# Patient Record
Sex: MALE | Race: WHITE
[De-identification: names, ages, dates, MRNs, and addresses within clinical notes are randomized per-mention and may not be internally consistent; named-entity substitution may affect disease eponyms.]

---

## 2019-03-15 ENCOUNTER — HOSPITAL ENCOUNTER (INPATIENT)
Dept: HOSPITAL 74 - YASAS | Age: 39
LOS: 3 days | Discharge: LEFT BEFORE DISCHARGE | DRG: 770 | End: 2019-03-18
Attending: SURGERY | Admitting: SURGERY
Payer: COMMERCIAL

## 2019-03-15 ENCOUNTER — EMERGENCY (EMERGENCY)
Facility: HOSPITAL | Age: 39
LOS: 1 days | Discharge: HOME | End: 2019-03-15
Attending: EMERGENCY MEDICINE | Admitting: EMERGENCY MEDICINE
Payer: MEDICAID

## 2019-03-15 VITALS
SYSTOLIC BLOOD PRESSURE: 110 MMHG | TEMPERATURE: 98 F | RESPIRATION RATE: 16 BRPM | HEART RATE: 90 BPM | OXYGEN SATURATION: 97 % | DIASTOLIC BLOOD PRESSURE: 69 MMHG

## 2019-03-15 VITALS
OXYGEN SATURATION: 98 % | HEART RATE: 115 BPM | WEIGHT: 184.97 LBS | RESPIRATION RATE: 18 BRPM | SYSTOLIC BLOOD PRESSURE: 174 MMHG | DIASTOLIC BLOOD PRESSURE: 81 MMHG | TEMPERATURE: 99 F

## 2019-03-15 VITALS — BODY MASS INDEX: 22.3 KG/M2

## 2019-03-15 DIAGNOSIS — E87.6: ICD-10-CM

## 2019-03-15 DIAGNOSIS — S00.01XA ABRASION OF SCALP, INITIAL ENCOUNTER: ICD-10-CM

## 2019-03-15 DIAGNOSIS — R63.8: ICD-10-CM

## 2019-03-15 DIAGNOSIS — Z79.899 OTHER LONG TERM (CURRENT) DRUG THERAPY: ICD-10-CM

## 2019-03-15 DIAGNOSIS — F10.230: ICD-10-CM
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
## 2019-03-15 DIAGNOSIS — Z86.69: ICD-10-CM

## 2019-03-15 DIAGNOSIS — I10: ICD-10-CM
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
## 2019-03-15 DIAGNOSIS — Z23 ENCOUNTER FOR IMMUNIZATION: ICD-10-CM

## 2019-03-15 DIAGNOSIS — R00.0: ICD-10-CM

## 2019-03-15 DIAGNOSIS — F13.230: ICD-10-CM

## 2019-03-15 DIAGNOSIS — R56.9 UNSPECIFIED CONVULSIONS: ICD-10-CM

## 2019-03-15 DIAGNOSIS — F19.230 OTHER PSYCHOACTIVE SUBSTANCE DEPENDENCE WITH WITHDRAWAL, UNCOMPLICATED: ICD-10-CM

## 2019-03-15 DIAGNOSIS — Z79.891 LONG TERM (CURRENT) USE OF OPIATE ANALGESIC: ICD-10-CM

## 2019-03-15 DIAGNOSIS — F12.20: ICD-10-CM

## 2019-03-15 DIAGNOSIS — F17.210: ICD-10-CM
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
## 2019-03-15 DIAGNOSIS — R94.5: ICD-10-CM

## 2019-03-15 DIAGNOSIS — F19.24: ICD-10-CM
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
## 2019-03-15 DIAGNOSIS — M65.342: ICD-10-CM

## 2019-03-15 DIAGNOSIS — F14.20: ICD-10-CM

## 2019-03-15 DIAGNOSIS — F11.23: Primary | ICD-10-CM

## 2019-03-15 DIAGNOSIS — L02.811: ICD-10-CM

## 2019-03-15 LAB
ALBUMIN SERPL ELPH-MCNC: 3.8 G/DL — SIGNIFICANT CHANGE UP (ref 3.4–5)
ALP SERPL-CCNC: 92 U/L — SIGNIFICANT CHANGE UP (ref 40–120)
ALT FLD-CCNC: 213 U/L — HIGH (ref 12–42)
ANION GAP SERPL CALC-SCNC: 10 MMOL/L — SIGNIFICANT CHANGE UP (ref 9–16)
AST SERPL-CCNC: 135 U/L — HIGH (ref 15–37)
BILIRUB SERPL-MCNC: 0.4 MG/DL — SIGNIFICANT CHANGE UP (ref 0.2–1.2)
BUN SERPL-MCNC: 11 MG/DL — SIGNIFICANT CHANGE UP (ref 7–23)
CALCIUM SERPL-MCNC: 9.5 MG/DL — SIGNIFICANT CHANGE UP (ref 8.5–10.5)
CHLORIDE SERPL-SCNC: 105 MMOL/L — SIGNIFICANT CHANGE UP (ref 96–108)
CO2 SERPL-SCNC: 27 MMOL/L — SIGNIFICANT CHANGE UP (ref 22–31)
CREAT SERPL-MCNC: 0.97 MG/DL — SIGNIFICANT CHANGE UP (ref 0.5–1.3)
ETHANOL SERPL-MCNC: <3 MG/DL — SIGNIFICANT CHANGE UP
GLUCOSE SERPL-MCNC: 104 MG/DL — HIGH (ref 70–99)
HCT VFR BLD CALC: 42.5 % — SIGNIFICANT CHANGE UP (ref 39–50)
HGB BLD-MCNC: 14.7 G/DL — SIGNIFICANT CHANGE UP (ref 13–17)
MAGNESIUM SERPL-MCNC: 2.1 MG/DL — SIGNIFICANT CHANGE UP (ref 1.6–2.6)
MCHC RBC-ENTMCNC: 29.8 PG — SIGNIFICANT CHANGE UP (ref 27–34)
MCHC RBC-ENTMCNC: 34.6 G/DL — SIGNIFICANT CHANGE UP (ref 32–36)
MCV RBC AUTO: 86.2 FL — SIGNIFICANT CHANGE UP (ref 80–100)
PLATELET # BLD AUTO: 182 K/UL — SIGNIFICANT CHANGE UP (ref 150–400)
POTASSIUM SERPL-MCNC: 4.5 MMOL/L — SIGNIFICANT CHANGE UP (ref 3.5–5.3)
POTASSIUM SERPL-SCNC: 4.5 MMOL/L — SIGNIFICANT CHANGE UP (ref 3.5–5.3)
PROT SERPL-MCNC: 7.3 G/DL — SIGNIFICANT CHANGE UP (ref 6.4–8.2)
RBC # BLD: 4.93 M/UL — SIGNIFICANT CHANGE UP (ref 4.2–5.8)
RBC # FLD: 13.1 % — SIGNIFICANT CHANGE UP (ref 10.3–14.5)
SODIUM SERPL-SCNC: 142 MMOL/L — SIGNIFICANT CHANGE UP (ref 132–145)
TSH SERPL-MCNC: 2.28 UIU/ML — SIGNIFICANT CHANGE UP (ref 0.36–3.74)
WBC # BLD: 5.5 K/UL — SIGNIFICANT CHANGE UP (ref 3.8–10.5)
WBC # FLD AUTO: 5.5 K/UL — SIGNIFICANT CHANGE UP (ref 3.8–10.5)

## 2019-03-15 PROCEDURE — 73130 X-RAY EXAM OF HAND: CPT | Mod: 26,LT,RT

## 2019-03-15 PROCEDURE — 73120 X-RAY EXAM OF HAND: CPT | Mod: 26,LT,RT

## 2019-03-15 PROCEDURE — 99284 EMERGENCY DEPT VISIT MOD MDM: CPT | Mod: 25

## 2019-03-15 PROCEDURE — 70450 CT HEAD/BRAIN W/O DYE: CPT | Mod: 26

## 2019-03-15 PROCEDURE — 72125 CT NECK SPINE W/O DYE: CPT | Mod: 26

## 2019-03-15 PROCEDURE — 93010 ELECTROCARDIOGRAM REPORT: CPT

## 2019-03-15 RX ORDER — HYDROMORPHONE HYDROCHLORIDE 2 MG/ML
1 INJECTION INTRAMUSCULAR; INTRAVENOUS; SUBCUTANEOUS ONCE
Qty: 0 | Refills: 0 | Status: DISCONTINUED | OUTPATIENT
Start: 2019-03-15 | End: 2019-03-15

## 2019-03-15 RX ORDER — ONDANSETRON 8 MG/1
4 TABLET, FILM COATED ORAL ONCE
Qty: 0 | Refills: 0 | Status: COMPLETED | OUTPATIENT
Start: 2019-03-15 | End: 2019-03-15

## 2019-03-15 RX ORDER — SODIUM CHLORIDE 9 MG/ML
1000 INJECTION INTRAMUSCULAR; INTRAVENOUS; SUBCUTANEOUS ONCE
Qty: 0 | Refills: 0 | Status: COMPLETED | OUTPATIENT
Start: 2019-03-15 | End: 2019-03-15

## 2019-03-15 RX ORDER — TETANUS TOXOID, REDUCED DIPHTHERIA TOXOID AND ACELLULAR PERTUSSIS VACCINE, ADSORBED 5; 2.5; 8; 8; 2.5 [IU]/.5ML; [IU]/.5ML; UG/.5ML; UG/.5ML; UG/.5ML
0.5 SUSPENSION INTRAMUSCULAR ONCE
Qty: 0 | Refills: 0 | Status: COMPLETED | OUTPATIENT
Start: 2019-03-15 | End: 2019-03-15

## 2019-03-15 RX ADMIN — HYDROMORPHONE HYDROCHLORIDE 1 MILLIGRAM(S): 2 INJECTION INTRAMUSCULAR; INTRAVENOUS; SUBCUTANEOUS at 13:32

## 2019-03-15 RX ADMIN — TETANUS TOXOID, REDUCED DIPHTHERIA TOXOID AND ACELLULAR PERTUSSIS VACCINE, ADSORBED 0.5 MILLILITER(S): 5; 2.5; 8; 8; 2.5 SUSPENSION INTRAMUSCULAR at 13:32

## 2019-03-15 RX ADMIN — SODIUM CHLORIDE 1000 MILLILITER(S): 9 INJECTION INTRAMUSCULAR; INTRAVENOUS; SUBCUTANEOUS at 13:33

## 2019-03-15 RX ADMIN — ONDANSETRON 4 MILLIGRAM(S): 8 TABLET, FILM COATED ORAL at 13:32

## 2019-03-15 RX ADMIN — Medication 2 MILLIGRAM(S): at 13:32

## 2019-03-15 NOTE — ED ADULT NURSE NOTE - INTERVENTIONS DEFINITIONS
Instruct patient to call for assistance/Stretcher in lowest position, wheels locked, appropriate side rails in place

## 2019-03-15 NOTE — ED PROVIDER NOTE - CARE PROVIDER_API CALL
Eneida Barnard)  Orthopaedic Surgery  [address]  [place] [address]  Phone: [phone]  Fax: [phone]  Follow Up Time:

## 2019-03-15 NOTE — ED PROVIDER NOTE - OBJECTIVE STATEMENT
39 y/o Male IDDA on methadone, on Suboxone, hx of illicit Xanax use BIB EMS for seizure attributes to taking xanax x5days ago. Pt reports he went outside of a building to smoke when he had a seizure. As per EMS, witness chronic 1 min of seizure and pt did not receive methadone prior seizure because he is on "full withdraw". Pt reports seeing spots. Pt notes starting from december, his fingers one by one weren't able to flex. Denies N/V, fever and dizziness. Pt notes Tetanus is UTD. 39 y/o Male IDDA on methadone, on Suboxone, hx of illicit Xanax use( gets it on street) BIB EMS for seizure attributes to stopping  taking xanax x5days ago. Pt reports he went outside of a building to smoke when he had a seizure. As per EMS, witness chronic 1 min of seizure and pt did not receive methadone prior seizure stating "I'm in full withdrawal".  Pt notes starting from december, his fingers one by one weren't able to flex. Denies N/V, fever and dizziness. Pt notes Tetanus is UTD. Denies ha/n/v/f/c.

## 2019-03-15 NOTE — SBIRT NOTE. - NSSBIRTSERVICES_GEN_A_ED_FT
Provided SBIRT services. Full Screen Positive. Referral to Treatment Performed. Screening results were reviewed with the patient and patient was provided information about healthy guidelines and potential negative consequences associated with level of risk.  Motivation and readiness to reduce or stop use was discussed and goals and activities to make changes were suggested/offered.   Referral for completed assesment and level of care determination at a certified treatment facility was completed by contacting the treatment facility via phone. [facility] [address] [place] [address]. Intake: [phone], [phone].   Audit :0  Dast :5  Duration : 20min

## 2019-03-15 NOTE — ED ADULT NURSE NOTE - OBJECTIVE STATEMENT
aox3, presents s/p witnessed seizure. pt had relapse and has been taking xanax for about a month and a half. took last dose of xanax 5days ago and had seizure this morning pt with lac to top of head. iv placed. labs drawn and sent. will cont to monitor. safety maintained

## 2019-03-15 NOTE — ED PROVIDER NOTE - NEUROLOGICAL, MLM
3x5 soft tissue swelling, right post occipital superior  fission, no underline laceration, no raccoon or ramirez's sign. Right 4th and 5th digits positive trigger finger ,no distal cap refill delay 3x5 soft tissue swelling, right post occipital region, superficial abrasion, no active bleeding,, no underlining laceration, no raccoon or ramirez's sign. Right 4th and 5th digits positive trigger finger ,no distal cap refill delay

## 2019-03-15 NOTE — ED PROVIDER NOTE - CARE PLAN
Principal Discharge DX:	Benzodiazepine withdrawal without complication  Secondary Diagnosis:	Abrasion of scalp, initial encounter

## 2019-03-15 NOTE — ED ADULT TRIAGE NOTE - CHIEF COMPLAINT QUOTE
here for witnessed seizure with head trauma and laceration- states his last dose of Xanax was 3 days ago- pt arrives A+Ox3

## 2019-03-16 LAB
ALBUMIN SERPL-MCNC: 3.3 G/DL (ref 3.4–5)
ALP SERPL-CCNC: 86 U/L (ref 45–117)
ALT SERPL-CCNC: 184 U/L (ref 13–61)
ANION GAP SERPL CALC-SCNC: 8 MMOL/L (ref 8–16)
AST SERPL-CCNC: 120 U/L (ref 15–37)
BILIRUB SERPL-MCNC: 0.4 MG/DL (ref 0.2–1)
BUN SERPL-MCNC: 12 MG/DL (ref 7–18)
CALCIUM SERPL-MCNC: 8.2 MG/DL (ref 8.5–10.1)
CHLORIDE SERPL-SCNC: 106 MMOL/L (ref 98–107)
CO2 SERPL-SCNC: 26 MMOL/L (ref 21–32)
CREAT SERPL-MCNC: 0.8 MG/DL (ref 0.55–1.3)
DEPRECATED RDW RBC AUTO: 13.8 % (ref 11.9–15.9)
GLUCOSE SERPL-MCNC: 75 MG/DL (ref 74–106)
HCT VFR BLD CALC: 38.7 % (ref 35.4–49)
HGB BLD-MCNC: 13.7 GM/DL (ref 11.7–16.9)
MCH RBC QN AUTO: 31.1 PG (ref 25.7–33.7)
MCHC RBC AUTO-ENTMCNC: 35.3 G/DL (ref 32–35.9)
MCV RBC: 88.2 FL (ref 80–96)
PLATELET # BLD AUTO: 157 K/MM3 (ref 134–434)
PMV BLD: 9.4 FL (ref 7.5–11.1)
POTASSIUM SERPLBLD-SCNC: 3.4 MMOL/L (ref 3.5–5.1)
PROT SERPL-MCNC: 6.2 G/DL (ref 6.4–8.2)
RBC # BLD AUTO: 4.39 M/MM3 (ref 4–5.6)
SODIUM SERPL-SCNC: 140 MMOL/L (ref 136–145)
WBC # BLD AUTO: 5.1 K/MM3 (ref 4–10)

## 2019-03-16 PROCEDURE — HZ2ZZZZ DETOXIFICATION SERVICES FOR SUBSTANCE ABUSE TREATMENT: ICD-10-PCS | Performed by: SURGERY

## 2019-03-16 RX ADMIN — BACITRACIN ZINC SCH GM: 500 OINTMENT TOPICAL at 01:05

## 2019-03-16 RX ADMIN — Medication SCH TAB: at 09:30

## 2019-03-16 RX ADMIN — BACITRACIN ZINC SCH: 500 OINTMENT TOPICAL at 22:49

## 2019-03-16 RX ADMIN — NICOTINE SCH MG: 21 PATCH TRANSDERMAL at 09:34

## 2019-03-16 RX ADMIN — POTASSIUM CHLORIDE SCH MEQ: 1500 TABLET, EXTENDED RELEASE ORAL at 19:00

## 2019-03-16 RX ADMIN — HYDROXYZINE PAMOATE PRN MG: 25 CAPSULE ORAL at 22:50

## 2019-03-16 RX ADMIN — BACITRACIN ZINC SCH GM: 500 OINTMENT TOPICAL at 09:30

## 2019-03-16 RX ADMIN — HYDROXYZINE PAMOATE PRN MG: 25 CAPSULE ORAL at 01:08

## 2019-03-17 RX ADMIN — POTASSIUM CHLORIDE SCH MEQ: 1500 TABLET, EXTENDED RELEASE ORAL at 10:51

## 2019-03-17 RX ADMIN — Medication SCH: at 00:18

## 2019-03-17 RX ADMIN — POTASSIUM CHLORIDE SCH MEQ: 1500 TABLET, EXTENDED RELEASE ORAL at 18:30

## 2019-03-17 RX ADMIN — BACITRACIN ZINC SCH: 500 OINTMENT TOPICAL at 22:27

## 2019-03-17 RX ADMIN — Medication SCH TAB: at 10:51

## 2019-03-17 RX ADMIN — BACITRACIN ZINC SCH: 500 OINTMENT TOPICAL at 10:46

## 2019-03-17 RX ADMIN — NICOTINE SCH MG: 21 PATCH TRANSDERMAL at 10:51

## 2019-03-17 RX ADMIN — Medication SCH MG: at 22:27

## 2019-03-18 VITALS — DIASTOLIC BLOOD PRESSURE: 56 MMHG | SYSTOLIC BLOOD PRESSURE: 99 MMHG | HEART RATE: 55 BPM | TEMPERATURE: 97.3 F

## 2019-03-18 LAB
ALT SERPL-CCNC: 145 U/L (ref 13–61)
APPEARANCE UR: CLEAR
AST SERPL-CCNC: 64 U/L (ref 15–37)
BILIRUB UR STRIP.AUTO-MCNC: NEGATIVE MG/DL
COLOR UR: (no result)
KETONES UR QL STRIP: NEGATIVE
LEUKOCYTE ESTERASE UR QL STRIP.AUTO: NEGATIVE
NITRITE UR QL STRIP: NEGATIVE
PH UR: 6 [PH] (ref 5–8)
POTASSIUM SERPLBLD-SCNC: 4 MMOL/L (ref 3.5–5.1)
PROT UR QL STRIP: NEGATIVE
PROT UR QL STRIP: NEGATIVE
SP GR UR: 1.02 (ref 1.01–1.03)
UROBILINOGEN UR STRIP-MCNC: NEGATIVE MG/DL (ref 0.2–1)

## 2019-03-18 RX ADMIN — POTASSIUM CHLORIDE SCH: 1500 TABLET, EXTENDED RELEASE ORAL at 10:15

## 2019-03-18 RX ADMIN — NICOTINE SCH: 21 PATCH TRANSDERMAL at 10:15

## 2019-03-18 RX ADMIN — BACITRACIN ZINC SCH: 500 OINTMENT TOPICAL at 10:15

## 2019-03-18 RX ADMIN — Medication SCH TAB: at 10:14

## 2021-07-21 ENCOUNTER — HOSPITAL ENCOUNTER (INPATIENT)
Dept: HOSPITAL 74 - YASAS | Age: 41
LOS: 4 days | Discharge: OTHER INSTITUTION | DRG: 773 | End: 2021-07-25
Attending: ALLERGY & IMMUNOLOGY | Admitting: ALLERGY & IMMUNOLOGY
Payer: COMMERCIAL

## 2021-07-21 VITALS — BODY MASS INDEX: 22.1 KG/M2

## 2021-07-21 DIAGNOSIS — F14.20: ICD-10-CM

## 2021-07-21 DIAGNOSIS — F19.24: ICD-10-CM

## 2021-07-21 DIAGNOSIS — F17.210: ICD-10-CM

## 2021-07-21 DIAGNOSIS — Z91.5: ICD-10-CM

## 2021-07-21 DIAGNOSIS — F13.20: ICD-10-CM

## 2021-07-21 DIAGNOSIS — F12.20: ICD-10-CM

## 2021-07-21 DIAGNOSIS — F10.230: Primary | ICD-10-CM

## 2021-07-21 DIAGNOSIS — Z86.69: ICD-10-CM

## 2021-07-21 DIAGNOSIS — F11.20: ICD-10-CM

## 2021-07-21 DIAGNOSIS — F39: ICD-10-CM

## 2021-07-21 PROCEDURE — C9803 HOPD COVID-19 SPEC COLLECT: HCPCS

## 2021-07-21 PROCEDURE — U0003 INFECTIOUS AGENT DETECTION BY NUCLEIC ACID (DNA OR RNA); SEVERE ACUTE RESPIRATORY SYNDROME CORONAVIRUS 2 (SARS-COV-2) (CORONAVIRUS DISEASE [COVID-19]), AMPLIFIED PROBE TECHNIQUE, MAKING USE OF HIGH THROUGHPUT TECHNOLOGIES AS DESCRIBED BY CMS-2020-01-R: HCPCS

## 2021-07-21 PROCEDURE — U0005 INFEC AGEN DETEC AMPLI PROBE: HCPCS

## 2021-07-21 PROCEDURE — HZ2ZZZZ DETOXIFICATION SERVICES FOR SUBSTANCE ABUSE TREATMENT: ICD-10-PCS | Performed by: ALLERGY & IMMUNOLOGY

## 2021-07-21 RX ADMIN — NICOTINE SCH: 14 PATCH, EXTENDED RELEASE TRANSDERMAL at 18:58

## 2021-07-21 RX ADMIN — Medication SCH MG: at 22:32

## 2021-07-21 RX ADMIN — HYDROXYZINE PAMOATE SCH MG: 25 CAPSULE ORAL at 22:32

## 2021-07-21 RX ADMIN — HYDROXYZINE PAMOATE SCH MG: 25 CAPSULE ORAL at 18:57

## 2021-07-21 RX ADMIN — METHOCARBAMOL PRN MG: 500 TABLET ORAL at 18:57

## 2021-07-22 LAB
ALBUMIN SERPL-MCNC: 3.4 G/DL (ref 3.4–5)
ALP SERPL-CCNC: 70 U/L (ref 45–117)
ALT SERPL-CCNC: 78 U/L (ref 13–61)
ANION GAP SERPL CALC-SCNC: 6 MMOL/L (ref 8–16)
AST SERPL-CCNC: 47 U/L (ref 15–37)
BILIRUB SERPL-MCNC: 0.2 MG/DL (ref 0.2–1)
BUN SERPL-MCNC: 17.5 MG/DL (ref 7–18)
CALCIUM SERPL-MCNC: 8.7 MG/DL (ref 8.5–10.1)
CHLORIDE SERPL-SCNC: 105 MMOL/L (ref 98–107)
CO2 SERPL-SCNC: 28 MMOL/L (ref 21–32)
CREAT SERPL-MCNC: 0.9 MG/DL (ref 0.55–1.3)
DEPRECATED RDW RBC AUTO: 13.4 % (ref 11.9–15.9)
GLUCOSE SERPL-MCNC: 81 MG/DL (ref 74–106)
HCT VFR BLD CALC: 38.6 % (ref 35.4–49)
HGB BLD-MCNC: 13 GM/DL (ref 11.7–16.9)
MCH RBC QN AUTO: 30.2 PG (ref 25.7–33.7)
MCHC RBC AUTO-ENTMCNC: 33.6 G/DL (ref 32–35.9)
MCV RBC: 89.8 FL (ref 80–96)
PLATELET # BLD AUTO: 198 10^3/UL (ref 134–434)
PMV BLD: 9.3 FL (ref 7.5–11.1)
PROT SERPL-MCNC: 6.7 G/DL (ref 6.4–8.2)
RBC # BLD AUTO: 4.3 M/MM3 (ref 4–5.6)
SODIUM SERPL-SCNC: 139 MMOL/L (ref 136–145)
WBC # BLD AUTO: 4.6 K/MM3 (ref 4–10)

## 2021-07-22 RX ADMIN — HYDROXYZINE PAMOATE PRN MG: 25 CAPSULE ORAL at 18:48

## 2021-07-22 RX ADMIN — NICOTINE SCH: 14 PATCH, EXTENDED RELEASE TRANSDERMAL at 10:19

## 2021-07-22 RX ADMIN — METHOCARBAMOL PRN MG: 500 TABLET ORAL at 17:32

## 2021-07-22 RX ADMIN — Medication SCH MG: at 22:17

## 2021-07-22 RX ADMIN — HYDROXYZINE PAMOATE PRN MG: 25 CAPSULE ORAL at 22:17

## 2021-07-22 RX ADMIN — Medication SCH TAB: at 10:19

## 2021-07-22 RX ADMIN — IBUPROFEN PRN MG: 400 TABLET, FILM COATED ORAL at 10:24

## 2021-07-22 RX ADMIN — METHOCARBAMOL PRN MG: 500 TABLET ORAL at 06:01

## 2021-07-22 RX ADMIN — HYDROXYZINE PAMOATE SCH MG: 25 CAPSULE ORAL at 06:01

## 2021-07-22 RX ADMIN — HYDROXYZINE PAMOATE PRN MG: 25 CAPSULE ORAL at 10:24

## 2021-07-23 RX ADMIN — Medication SCH MG: at 22:22

## 2021-07-23 RX ADMIN — HYDROXYZINE PAMOATE PRN MG: 25 CAPSULE ORAL at 02:35

## 2021-07-23 RX ADMIN — IBUPROFEN PRN MG: 400 TABLET, FILM COATED ORAL at 20:44

## 2021-07-23 RX ADMIN — Medication SCH TAB: at 10:28

## 2021-07-23 RX ADMIN — METHOCARBAMOL PRN MG: 500 TABLET ORAL at 01:05

## 2021-07-23 RX ADMIN — METHOCARBAMOL PRN MG: 500 TABLET ORAL at 10:55

## 2021-07-23 RX ADMIN — METHOCARBAMOL PRN MG: 500 TABLET ORAL at 20:44

## 2021-07-23 RX ADMIN — NICOTINE SCH: 14 PATCH, EXTENDED RELEASE TRANSDERMAL at 10:28

## 2021-07-23 RX ADMIN — HYDROXYZINE PAMOATE PRN MG: 25 CAPSULE ORAL at 22:21

## 2021-07-24 RX ADMIN — NICOTINE SCH: 14 PATCH, EXTENDED RELEASE TRANSDERMAL at 11:16

## 2021-07-24 RX ADMIN — HYDROXYZINE PAMOATE PRN MG: 25 CAPSULE ORAL at 08:40

## 2021-07-24 RX ADMIN — METHOCARBAMOL PRN MG: 500 TABLET ORAL at 17:32

## 2021-07-24 RX ADMIN — Medication SCH MG: at 22:54

## 2021-07-24 RX ADMIN — Medication SCH TAB: at 11:20

## 2021-07-24 RX ADMIN — METHOCARBAMOL PRN MG: 500 TABLET ORAL at 05:41

## 2021-07-24 RX ADMIN — IBUPROFEN PRN MG: 400 TABLET, FILM COATED ORAL at 19:43

## 2021-07-24 RX ADMIN — HYDROXYZINE PAMOATE PRN MG: 25 CAPSULE ORAL at 19:43

## 2021-07-24 RX ADMIN — Medication SCH MG: at 22:53

## 2021-07-24 RX ADMIN — HYDROXYZINE PAMOATE PRN MG: 25 CAPSULE ORAL at 17:32

## 2021-07-24 RX ADMIN — METHOCARBAMOL PRN MG: 500 TABLET ORAL at 11:19

## 2021-07-25 ENCOUNTER — HOSPITAL ENCOUNTER (INPATIENT)
Dept: HOSPITAL 74 - YASAS | Age: 41
LOS: 12 days | Discharge: HOME | DRG: 772 | End: 2021-08-06
Attending: ALLERGY & IMMUNOLOGY | Admitting: ALLERGY & IMMUNOLOGY
Payer: COMMERCIAL

## 2021-07-25 VITALS — DIASTOLIC BLOOD PRESSURE: 61 MMHG | TEMPERATURE: 96.9 F | SYSTOLIC BLOOD PRESSURE: 101 MMHG | HEART RATE: 67 BPM

## 2021-07-25 DIAGNOSIS — F14.20: ICD-10-CM

## 2021-07-25 DIAGNOSIS — F13.20: ICD-10-CM

## 2021-07-25 DIAGNOSIS — F19.24: ICD-10-CM

## 2021-07-25 DIAGNOSIS — F17.210: ICD-10-CM

## 2021-07-25 DIAGNOSIS — Z62.810: ICD-10-CM

## 2021-07-25 DIAGNOSIS — F19.280: ICD-10-CM

## 2021-07-25 DIAGNOSIS — Z86.11: ICD-10-CM

## 2021-07-25 DIAGNOSIS — F11.20: ICD-10-CM

## 2021-07-25 DIAGNOSIS — F39: ICD-10-CM

## 2021-07-25 DIAGNOSIS — Z86.69: ICD-10-CM

## 2021-07-25 DIAGNOSIS — F19.282: ICD-10-CM

## 2021-07-25 DIAGNOSIS — Z56.0: ICD-10-CM

## 2021-07-25 DIAGNOSIS — F10.20: Primary | ICD-10-CM

## 2021-07-25 PROCEDURE — HZ42ZZZ GROUP COUNSELING FOR SUBSTANCE ABUSE TREATMENT, COGNITIVE-BEHAVIORAL: ICD-10-PCS | Performed by: ALLERGY & IMMUNOLOGY

## 2021-07-25 RX ADMIN — NICOTINE SCH: 14 PATCH, EXTENDED RELEASE TRANSDERMAL at 10:51

## 2021-07-25 RX ADMIN — Medication SCH MG: at 21:50

## 2021-07-25 RX ADMIN — METHOCARBAMOL SCH MG: 500 TABLET ORAL at 21:50

## 2021-07-25 RX ADMIN — Medication SCH TAB: at 10:51

## 2021-07-25 RX ADMIN — METHOCARBAMOL PRN MG: 500 TABLET ORAL at 10:53

## 2021-07-25 RX ADMIN — HYDROXYZINE PAMOATE PRN MG: 25 CAPSULE ORAL at 10:53

## 2021-07-25 SDOH — ECONOMIC STABILITY - INCOME SECURITY: UNEMPLOYMENT, UNSPECIFIED: Z56.0

## 2021-07-26 RX ADMIN — QUETIAPINE FUMARATE SCH MG: 100 TABLET ORAL at 21:29

## 2021-07-26 RX ADMIN — HYDROXYZINE PAMOATE PRN MG: 50 CAPSULE ORAL at 10:22

## 2021-07-26 RX ADMIN — METHOCARBAMOL SCH MG: 500 TABLET ORAL at 06:29

## 2021-07-26 RX ADMIN — METHOCARBAMOL SCH MG: 500 TABLET ORAL at 14:29

## 2021-07-26 RX ADMIN — Medication SCH TAB: at 10:20

## 2021-07-26 RX ADMIN — METHOCARBAMOL SCH MG: 500 TABLET ORAL at 21:29

## 2021-07-26 RX ADMIN — Medication SCH MG: at 21:29

## 2021-07-26 RX ADMIN — NICOTINE SCH: 7 PATCH TRANSDERMAL at 10:20

## 2021-07-26 RX ADMIN — IBUPROFEN PRN MG: 400 TABLET, FILM COATED ORAL at 18:06

## 2021-07-26 RX ADMIN — HYDROXYZINE PAMOATE PRN MG: 50 CAPSULE ORAL at 18:06

## 2021-07-27 RX ADMIN — METHOCARBAMOL SCH MG: 500 TABLET ORAL at 21:05

## 2021-07-27 RX ADMIN — HYDROXYZINE PAMOATE PRN MG: 50 CAPSULE ORAL at 15:32

## 2021-07-27 RX ADMIN — NICOTINE SCH: 7 PATCH TRANSDERMAL at 09:57

## 2021-07-27 RX ADMIN — METHOCARBAMOL SCH MG: 500 TABLET ORAL at 06:49

## 2021-07-27 RX ADMIN — Medication SCH TAB: at 09:57

## 2021-07-27 RX ADMIN — HYDROXYZINE PAMOATE PRN MG: 50 CAPSULE ORAL at 21:05

## 2021-07-27 RX ADMIN — METHOCARBAMOL SCH MG: 500 TABLET ORAL at 13:13

## 2021-07-27 RX ADMIN — Medication SCH MG: at 21:05

## 2021-07-27 RX ADMIN — QUETIAPINE FUMARATE SCH MG: 100 TABLET ORAL at 21:05

## 2021-07-27 RX ADMIN — HYDROXYZINE PAMOATE PRN MG: 50 CAPSULE ORAL at 09:58

## 2021-07-27 RX ADMIN — IBUPROFEN PRN MG: 400 TABLET, FILM COATED ORAL at 13:14

## 2021-07-28 RX ADMIN — HYDROXYZINE PAMOATE PRN MG: 50 CAPSULE ORAL at 14:29

## 2021-07-28 RX ADMIN — METHOCARBAMOL SCH MG: 500 TABLET ORAL at 14:29

## 2021-07-28 RX ADMIN — METHOCARBAMOL SCH MG: 500 TABLET ORAL at 06:47

## 2021-07-28 RX ADMIN — Medication SCH TAB: at 09:40

## 2021-07-28 RX ADMIN — QUETIAPINE FUMARATE SCH MG: 100 TABLET ORAL at 21:25

## 2021-07-28 RX ADMIN — HYDROXYZINE PAMOATE PRN MG: 50 CAPSULE ORAL at 09:40

## 2021-07-28 RX ADMIN — NICOTINE SCH: 7 PATCH TRANSDERMAL at 09:41

## 2021-07-28 RX ADMIN — Medication SCH MG: at 21:25

## 2021-07-28 RX ADMIN — HYDROXYZINE PAMOATE PRN MG: 50 CAPSULE ORAL at 21:25

## 2021-07-28 RX ADMIN — METHOCARBAMOL SCH MG: 500 TABLET ORAL at 21:25

## 2021-07-29 RX ADMIN — NICOTINE SCH: 7 PATCH TRANSDERMAL at 10:20

## 2021-07-29 RX ADMIN — HYDROXYZINE PAMOATE PRN MG: 50 CAPSULE ORAL at 12:15

## 2021-07-29 RX ADMIN — HYDROXYZINE PAMOATE PRN MG: 50 CAPSULE ORAL at 21:30

## 2021-07-29 RX ADMIN — METHOCARBAMOL SCH MG: 500 TABLET ORAL at 07:31

## 2021-07-29 RX ADMIN — HYDROXYZINE PAMOATE PRN MG: 50 CAPSULE ORAL at 17:50

## 2021-07-29 RX ADMIN — Medication SCH: at 10:20

## 2021-07-29 RX ADMIN — METHOCARBAMOL SCH MG: 500 TABLET ORAL at 21:29

## 2021-07-29 RX ADMIN — Medication SCH MG: at 21:49

## 2021-07-29 RX ADMIN — QUETIAPINE FUMARATE SCH MG: 100 TABLET ORAL at 21:29

## 2021-07-29 RX ADMIN — METHOCARBAMOL SCH MG: 500 TABLET ORAL at 13:43

## 2021-07-29 RX ADMIN — Medication SCH MG: at 21:29

## 2021-07-30 RX ADMIN — METHOCARBAMOL SCH MG: 500 TABLET ORAL at 06:40

## 2021-07-30 RX ADMIN — HYDROXYZINE PAMOATE PRN MG: 50 CAPSULE ORAL at 21:55

## 2021-07-30 RX ADMIN — Medication SCH MG: at 21:54

## 2021-07-30 RX ADMIN — QUETIAPINE FUMARATE SCH MG: 100 TABLET ORAL at 21:53

## 2021-07-30 RX ADMIN — HYDROXYZINE PAMOATE PRN MG: 50 CAPSULE ORAL at 10:32

## 2021-07-30 RX ADMIN — METHOCARBAMOL SCH MG: 500 TABLET ORAL at 21:53

## 2021-07-30 RX ADMIN — Medication SCH TAB: at 10:32

## 2021-07-30 RX ADMIN — METHOCARBAMOL SCH MG: 500 TABLET ORAL at 14:31

## 2021-07-30 RX ADMIN — NICOTINE SCH: 7 PATCH TRANSDERMAL at 10:33

## 2021-07-30 RX ADMIN — HYDROXYZINE PAMOATE PRN MG: 50 CAPSULE ORAL at 14:32

## 2021-07-31 RX ADMIN — NICOTINE SCH: 7 PATCH TRANSDERMAL at 10:24

## 2021-07-31 RX ADMIN — Medication SCH MG: at 21:34

## 2021-07-31 RX ADMIN — METHOCARBAMOL SCH MG: 500 TABLET ORAL at 21:34

## 2021-07-31 RX ADMIN — METHOCARBAMOL SCH MG: 500 TABLET ORAL at 07:40

## 2021-07-31 RX ADMIN — HYDROXYZINE PAMOATE PRN MG: 50 CAPSULE ORAL at 18:41

## 2021-07-31 RX ADMIN — Medication SCH TAB: at 10:24

## 2021-07-31 RX ADMIN — METHOCARBAMOL SCH MG: 500 TABLET ORAL at 14:44

## 2021-07-31 RX ADMIN — QUETIAPINE FUMARATE SCH MG: 100 TABLET ORAL at 21:33

## 2021-07-31 RX ADMIN — HYDROXYZINE PAMOATE PRN MG: 50 CAPSULE ORAL at 10:24

## 2021-07-31 RX ADMIN — Medication SCH MG: at 21:33

## 2021-08-01 RX ADMIN — QUETIAPINE FUMARATE SCH MG: 100 TABLET ORAL at 21:19

## 2021-08-01 RX ADMIN — HYDROXYZINE PAMOATE PRN MG: 50 CAPSULE ORAL at 21:19

## 2021-08-01 RX ADMIN — METHOCARBAMOL SCH MG: 500 TABLET ORAL at 07:09

## 2021-08-01 RX ADMIN — HYDROXYZINE PAMOATE PRN MG: 50 CAPSULE ORAL at 10:16

## 2021-08-01 RX ADMIN — Medication SCH MG: at 21:19

## 2021-08-01 RX ADMIN — NICOTINE SCH: 7 PATCH TRANSDERMAL at 10:16

## 2021-08-01 RX ADMIN — METHOCARBAMOL SCH MG: 500 TABLET ORAL at 21:19

## 2021-08-01 RX ADMIN — Medication SCH TAB: at 10:15

## 2021-08-01 RX ADMIN — METHOCARBAMOL SCH MG: 500 TABLET ORAL at 14:00

## 2021-08-02 RX ADMIN — Medication SCH MG: at 21:59

## 2021-08-02 RX ADMIN — HYDROXYZINE PAMOATE PRN MG: 50 CAPSULE ORAL at 13:42

## 2021-08-02 RX ADMIN — HYDROXYZINE PAMOATE PRN MG: 50 CAPSULE ORAL at 10:11

## 2021-08-02 RX ADMIN — HYDROXYZINE PAMOATE PRN MG: 50 CAPSULE ORAL at 21:59

## 2021-08-02 RX ADMIN — Medication SCH TAB: at 10:10

## 2021-08-02 RX ADMIN — METHOCARBAMOL SCH MG: 500 TABLET ORAL at 13:40

## 2021-08-02 RX ADMIN — NICOTINE SCH: 7 PATCH TRANSDERMAL at 10:10

## 2021-08-02 RX ADMIN — METHOCARBAMOL SCH MG: 500 TABLET ORAL at 21:59

## 2021-08-02 RX ADMIN — QUETIAPINE FUMARATE SCH MG: 100 TABLET ORAL at 21:59

## 2021-08-02 RX ADMIN — METHOCARBAMOL SCH MG: 500 TABLET ORAL at 07:08

## 2021-08-03 RX ADMIN — METHOCARBAMOL SCH MG: 500 TABLET ORAL at 21:44

## 2021-08-03 RX ADMIN — Medication SCH MG: at 21:44

## 2021-08-03 RX ADMIN — QUETIAPINE FUMARATE SCH MG: 100 TABLET ORAL at 21:44

## 2021-08-03 RX ADMIN — NICOTINE SCH: 7 PATCH TRANSDERMAL at 10:57

## 2021-08-03 RX ADMIN — METHOCARBAMOL SCH: 500 TABLET ORAL at 06:30

## 2021-08-03 RX ADMIN — HYDROXYZINE PAMOATE PRN MG: 50 CAPSULE ORAL at 18:28

## 2021-08-03 RX ADMIN — Medication SCH: at 10:57

## 2021-08-03 RX ADMIN — IBUPROFEN PRN MG: 400 TABLET, FILM COATED ORAL at 18:28

## 2021-08-03 RX ADMIN — HYDROXYZINE PAMOATE PRN MG: 50 CAPSULE ORAL at 13:16

## 2021-08-03 RX ADMIN — METHOCARBAMOL SCH MG: 500 TABLET ORAL at 13:16

## 2021-08-04 RX ADMIN — QUETIAPINE FUMARATE SCH MG: 100 TABLET ORAL at 21:21

## 2021-08-04 RX ADMIN — METHOCARBAMOL SCH: 500 TABLET ORAL at 06:46

## 2021-08-04 RX ADMIN — Medication SCH TAB: at 09:38

## 2021-08-04 RX ADMIN — NICOTINE SCH: 7 PATCH TRANSDERMAL at 09:39

## 2021-08-04 RX ADMIN — Medication SCH MG: at 21:21

## 2021-08-04 RX ADMIN — METHOCARBAMOL SCH MG: 500 TABLET ORAL at 14:25

## 2021-08-04 RX ADMIN — HYDROXYZINE PAMOATE PRN MG: 50 CAPSULE ORAL at 14:25

## 2021-08-04 RX ADMIN — HYDROXYZINE PAMOATE PRN MG: 50 CAPSULE ORAL at 09:42

## 2021-08-04 RX ADMIN — METHOCARBAMOL PRN MG: 500 TABLET ORAL at 21:22

## 2021-08-04 RX ADMIN — HYDROXYZINE PAMOATE PRN MG: 50 CAPSULE ORAL at 21:21

## 2021-08-05 VITALS — HEART RATE: 52 BPM | SYSTOLIC BLOOD PRESSURE: 102 MMHG | TEMPERATURE: 97.1 F | DIASTOLIC BLOOD PRESSURE: 60 MMHG

## 2021-08-05 RX ADMIN — METHOCARBAMOL PRN MG: 500 TABLET ORAL at 09:51

## 2021-08-05 RX ADMIN — METHOCARBAMOL PRN MG: 500 TABLET ORAL at 21:31

## 2021-08-05 RX ADMIN — Medication SCH TAB: at 09:51

## 2021-08-05 RX ADMIN — HYDROXYZINE PAMOATE PRN MG: 50 CAPSULE ORAL at 21:31

## 2021-08-05 RX ADMIN — Medication SCH MG: at 21:31

## 2021-08-05 RX ADMIN — QUETIAPINE FUMARATE SCH MG: 100 TABLET ORAL at 21:31

## 2021-08-05 RX ADMIN — NICOTINE SCH: 7 PATCH TRANSDERMAL at 09:52

## 2021-08-06 RX ADMIN — Medication SCH: at 09:07

## 2021-08-06 RX ADMIN — NICOTINE SCH: 7 PATCH TRANSDERMAL at 09:07

## 2021-12-11 ENCOUNTER — HOSPITAL ENCOUNTER (INPATIENT)
Dept: HOSPITAL 74 - YASAS | Age: 41
LOS: 5 days | Discharge: OTHER INSTITUTION | DRG: 773 | End: 2021-12-16
Attending: ALLERGY & IMMUNOLOGY | Admitting: ALLERGY & IMMUNOLOGY
Payer: COMMERCIAL

## 2021-12-11 VITALS — BODY MASS INDEX: 22.5 KG/M2

## 2021-12-11 DIAGNOSIS — F17.210: ICD-10-CM

## 2021-12-11 DIAGNOSIS — F13.230: Primary | ICD-10-CM

## 2021-12-11 DIAGNOSIS — R40.0: ICD-10-CM

## 2021-12-11 DIAGNOSIS — Z86.69: ICD-10-CM

## 2021-12-11 DIAGNOSIS — F11.220: ICD-10-CM

## 2021-12-11 PROCEDURE — U0005 INFEC AGEN DETEC AMPLI PROBE: HCPCS

## 2021-12-11 PROCEDURE — HZ2ZZZZ DETOXIFICATION SERVICES FOR SUBSTANCE ABUSE TREATMENT: ICD-10-PCS | Performed by: ALLERGY & IMMUNOLOGY

## 2021-12-11 PROCEDURE — C9803 HOPD COVID-19 SPEC COLLECT: HCPCS

## 2021-12-11 PROCEDURE — U0003 INFECTIOUS AGENT DETECTION BY NUCLEIC ACID (DNA OR RNA); SEVERE ACUTE RESPIRATORY SYNDROME CORONAVIRUS 2 (SARS-COV-2) (CORONAVIRUS DISEASE [COVID-19]), AMPLIFIED PROBE TECHNIQUE, MAKING USE OF HIGH THROUGHPUT TECHNOLOGIES AS DESCRIBED BY CMS-2020-01-R: HCPCS

## 2021-12-11 RX ADMIN — Medication SCH MG: at 22:45

## 2021-12-12 LAB
ALBUMIN SERPL-MCNC: 2.7 G/DL (ref 3.4–5)
ALP SERPL-CCNC: 88 U/L (ref 45–117)
ALT SERPL-CCNC: 42 U/L (ref 13–61)
ANION GAP SERPL CALC-SCNC: 4 MMOL/L (ref 8–16)
AST SERPL-CCNC: 32 U/L (ref 15–37)
BILIRUB SERPL-MCNC: 0.2 MG/DL (ref 0.2–1)
BUN SERPL-MCNC: 18.2 MG/DL (ref 7–18)
CALCIUM SERPL-MCNC: 8.1 MG/DL (ref 8.5–10.1)
CHLORIDE SERPL-SCNC: 110 MMOL/L (ref 98–107)
CO2 SERPL-SCNC: 29 MMOL/L (ref 21–32)
CREAT SERPL-MCNC: 1.2 MG/DL (ref 0.55–1.3)
DEPRECATED RDW RBC AUTO: 14.3 % (ref 11.9–15.9)
GLUCOSE SERPL-MCNC: 99 MG/DL (ref 74–106)
HCT VFR BLD CALC: 36.3 % (ref 35.4–49)
HGB BLD-MCNC: 12.6 GM/DL (ref 11.7–16.9)
MCH RBC QN AUTO: 30.7 PG (ref 25.7–33.7)
MCHC RBC AUTO-ENTMCNC: 34.8 G/DL (ref 32–35.9)
MCV RBC: 88.2 FL (ref 80–96)
PLATELET # BLD AUTO: 149 10^3/UL (ref 134–434)
PMV BLD: 8.7 FL (ref 7.5–11.1)
PROT SERPL-MCNC: 6.1 G/DL (ref 6.4–8.2)
RBC # BLD AUTO: 4.12 M/MM3 (ref 4–5.6)
SODIUM SERPL-SCNC: 143 MMOL/L (ref 136–145)
WBC # BLD AUTO: 6.8 K/MM3 (ref 4–10)

## 2021-12-12 RX ADMIN — Medication SCH: at 10:45

## 2021-12-12 RX ADMIN — METHOCARBAMOL PRN MG: 500 TABLET ORAL at 14:19

## 2021-12-12 RX ADMIN — AMOXICILLIN SCH MG: 500 CAPSULE ORAL at 23:03

## 2021-12-12 RX ADMIN — Medication SCH MG: at 23:03

## 2021-12-12 RX ADMIN — HYDROXYZINE PAMOATE PRN MG: 25 CAPSULE ORAL at 23:03

## 2021-12-13 RX ADMIN — AMOXICILLIN SCH MG: 500 CAPSULE ORAL at 22:18

## 2021-12-13 RX ADMIN — Medication SCH MG: at 22:18

## 2021-12-13 RX ADMIN — AMOXICILLIN SCH MG: 500 CAPSULE ORAL at 11:10

## 2021-12-13 RX ADMIN — Medication SCH TAB: at 11:13

## 2021-12-13 RX ADMIN — METHOCARBAMOL PRN MG: 500 TABLET ORAL at 22:23

## 2021-12-14 RX ADMIN — Medication SCH TAB: at 10:17

## 2021-12-14 RX ADMIN — AMOXICILLIN SCH MG: 500 CAPSULE ORAL at 10:17

## 2021-12-14 RX ADMIN — HYDROXYZINE PAMOATE PRN MG: 25 CAPSULE ORAL at 10:17

## 2021-12-14 RX ADMIN — AMOXICILLIN SCH MG: 500 CAPSULE ORAL at 22:24

## 2021-12-14 RX ADMIN — Medication SCH MG: at 22:24

## 2021-12-14 RX ADMIN — METHOCARBAMOL PRN MG: 500 TABLET ORAL at 10:17

## 2021-12-14 RX ADMIN — HYDROXYZINE PAMOATE PRN MG: 25 CAPSULE ORAL at 17:44

## 2021-12-14 RX ADMIN — Medication SCH MG: at 22:25

## 2021-12-14 RX ADMIN — METHOCARBAMOL PRN MG: 500 TABLET ORAL at 17:44

## 2021-12-14 RX ADMIN — HYDROXYZINE PAMOATE PRN MG: 25 CAPSULE ORAL at 22:24

## 2021-12-15 RX ADMIN — METHOCARBAMOL PRN MG: 500 TABLET ORAL at 17:55

## 2021-12-15 RX ADMIN — Medication SCH MG: at 22:34

## 2021-12-15 RX ADMIN — AMOXICILLIN SCH MG: 500 CAPSULE ORAL at 22:34

## 2021-12-15 RX ADMIN — HYDROXYZINE PAMOATE PRN MG: 25 CAPSULE ORAL at 10:33

## 2021-12-15 RX ADMIN — AMOXICILLIN SCH MG: 500 CAPSULE ORAL at 10:33

## 2021-12-15 RX ADMIN — HYDROXYZINE PAMOATE PRN MG: 25 CAPSULE ORAL at 17:53

## 2021-12-15 RX ADMIN — Medication SCH TAB: at 10:33

## 2021-12-15 RX ADMIN — METHOCARBAMOL PRN MG: 500 TABLET ORAL at 10:33

## 2021-12-16 ENCOUNTER — HOSPITAL ENCOUNTER (INPATIENT)
Dept: HOSPITAL 74 - YASAS | Age: 41
LOS: 2 days | Discharge: LEFT BEFORE DISCHARGE | DRG: 770 | End: 2021-12-18
Attending: ALLERGY & IMMUNOLOGY | Admitting: ALLERGY & IMMUNOLOGY
Payer: COMMERCIAL

## 2021-12-16 VITALS — TEMPERATURE: 97.8 F | HEART RATE: 84 BPM | SYSTOLIC BLOOD PRESSURE: 133 MMHG | DIASTOLIC BLOOD PRESSURE: 88 MMHG

## 2021-12-16 DIAGNOSIS — H66.91: ICD-10-CM

## 2021-12-16 DIAGNOSIS — F41.9: ICD-10-CM

## 2021-12-16 DIAGNOSIS — F12.20: ICD-10-CM

## 2021-12-16 DIAGNOSIS — F13.20: ICD-10-CM

## 2021-12-16 DIAGNOSIS — Z91.51: ICD-10-CM

## 2021-12-16 DIAGNOSIS — F32.A: ICD-10-CM

## 2021-12-16 DIAGNOSIS — F10.20: Primary | ICD-10-CM

## 2021-12-16 DIAGNOSIS — Z56.0: ICD-10-CM

## 2021-12-16 DIAGNOSIS — F14.20: ICD-10-CM

## 2021-12-16 DIAGNOSIS — H93.11: ICD-10-CM

## 2021-12-16 DIAGNOSIS — F19.24: ICD-10-CM

## 2021-12-16 DIAGNOSIS — F11.20: ICD-10-CM

## 2021-12-16 DIAGNOSIS — F19.282: ICD-10-CM

## 2021-12-16 DIAGNOSIS — Z86.69: ICD-10-CM

## 2021-12-16 DIAGNOSIS — F19.280: ICD-10-CM

## 2021-12-16 DIAGNOSIS — F17.210: ICD-10-CM

## 2021-12-16 PROCEDURE — HZ42ZZZ GROUP COUNSELING FOR SUBSTANCE ABUSE TREATMENT, COGNITIVE-BEHAVIORAL: ICD-10-PCS | Performed by: ALLERGY & IMMUNOLOGY

## 2021-12-16 RX ADMIN — CARBAMIDE PEROXIDE SCH DROP: 6.5 SOLUTION/ DROPS TOPICAL at 21:27

## 2021-12-16 RX ADMIN — HYDROXYZINE PAMOATE PRN MG: 25 CAPSULE ORAL at 21:29

## 2021-12-16 RX ADMIN — HYDROXYZINE PAMOATE PRN MG: 25 CAPSULE ORAL at 09:56

## 2021-12-16 RX ADMIN — AMOXICILLIN SCH MG: 500 CAPSULE ORAL at 21:26

## 2021-12-16 RX ADMIN — Medication SCH MG: at 21:25

## 2021-12-16 RX ADMIN — AMOXICILLIN SCH MG: 500 CAPSULE ORAL at 09:56

## 2021-12-16 RX ADMIN — Medication SCH TAB: at 09:55

## 2021-12-16 RX ADMIN — HYDROXYZINE PAMOATE PRN MG: 25 CAPSULE ORAL at 17:08

## 2021-12-16 SDOH — ECONOMIC STABILITY - INCOME SECURITY: UNEMPLOYMENT, UNSPECIFIED: Z56.0

## 2021-12-17 VITALS — TEMPERATURE: 98.4 F

## 2021-12-17 RX ADMIN — IBUPROFEN PRN MG: 400 TABLET, FILM COATED ORAL at 21:10

## 2021-12-17 RX ADMIN — AMOXICILLIN SCH MG: 500 CAPSULE ORAL at 09:38

## 2021-12-17 RX ADMIN — CARBAMIDE PEROXIDE SCH DROP: 6.5 SOLUTION/ DROPS TOPICAL at 21:10

## 2021-12-17 RX ADMIN — IBUPROFEN PRN MG: 400 TABLET, FILM COATED ORAL at 15:08

## 2021-12-17 RX ADMIN — NICOTINE SCH: 7 PATCH TRANSDERMAL at 09:39

## 2021-12-17 RX ADMIN — HYDROXYZINE PAMOATE PRN MG: 25 CAPSULE ORAL at 21:08

## 2021-12-17 RX ADMIN — Medication SCH MG: at 21:07

## 2021-12-17 RX ADMIN — CARBAMIDE PEROXIDE SCH DROP: 6.5 SOLUTION/ DROPS TOPICAL at 09:38

## 2021-12-17 RX ADMIN — Medication SCH TAB: at 09:39

## 2021-12-17 RX ADMIN — AMOXICILLIN SCH MG: 500 CAPSULE ORAL at 21:08

## 2021-12-17 RX ADMIN — HYDROXYZINE PAMOATE PRN MG: 25 CAPSULE ORAL at 06:36

## 2021-12-18 VITALS — HEART RATE: 73 BPM | DIASTOLIC BLOOD PRESSURE: 77 MMHG | SYSTOLIC BLOOD PRESSURE: 149 MMHG

## 2021-12-18 RX ADMIN — CARBAMIDE PEROXIDE SCH DROP: 6.5 SOLUTION/ DROPS TOPICAL at 10:31

## 2021-12-18 RX ADMIN — NICOTINE SCH: 7 PATCH TRANSDERMAL at 10:30

## 2021-12-18 RX ADMIN — HYDROXYZINE PAMOATE PRN MG: 25 CAPSULE ORAL at 10:30

## 2021-12-18 RX ADMIN — Medication SCH TAB: at 10:30

## 2021-12-18 RX ADMIN — AMOXICILLIN SCH MG: 500 CAPSULE ORAL at 10:30

## 2022-10-15 ENCOUNTER — HOSPITAL ENCOUNTER (INPATIENT)
Dept: HOSPITAL 74 - YASAS | Age: 42
LOS: 6 days | Discharge: HOME | DRG: 773 | End: 2022-10-21
Attending: SURGERY | Admitting: ALLERGY & IMMUNOLOGY
Payer: COMMERCIAL

## 2022-10-15 VITALS — BODY MASS INDEX: 23.7 KG/M2

## 2022-10-15 DIAGNOSIS — R74.8: ICD-10-CM

## 2022-10-15 DIAGNOSIS — F13.232: Primary | ICD-10-CM

## 2022-10-15 DIAGNOSIS — F32.A: ICD-10-CM

## 2022-10-15 DIAGNOSIS — F41.9: ICD-10-CM

## 2022-10-15 DIAGNOSIS — Z86.69: ICD-10-CM

## 2022-10-15 DIAGNOSIS — F11.20: ICD-10-CM

## 2022-10-15 DIAGNOSIS — B18.2: ICD-10-CM

## 2022-10-15 DIAGNOSIS — B19.10: ICD-10-CM

## 2022-10-15 DIAGNOSIS — F14.20: ICD-10-CM

## 2022-10-15 DIAGNOSIS — F12.20: ICD-10-CM

## 2022-10-15 DIAGNOSIS — F17.210: ICD-10-CM

## 2022-10-15 DIAGNOSIS — F10.20: ICD-10-CM

## 2022-10-15 DIAGNOSIS — F19.24: ICD-10-CM

## 2022-10-15 DIAGNOSIS — F31.9: ICD-10-CM

## 2022-10-15 DIAGNOSIS — R40.0: ICD-10-CM

## 2022-10-15 DIAGNOSIS — Z20.822: ICD-10-CM

## 2022-10-15 PROCEDURE — U0005 INFEC AGEN DETEC AMPLI PROBE: HCPCS

## 2022-10-15 PROCEDURE — U0003 INFECTIOUS AGENT DETECTION BY NUCLEIC ACID (DNA OR RNA); SEVERE ACUTE RESPIRATORY SYNDROME CORONAVIRUS 2 (SARS-COV-2) (CORONAVIRUS DISEASE [COVID-19]), AMPLIFIED PROBE TECHNIQUE, MAKING USE OF HIGH THROUGHPUT TECHNOLOGIES AS DESCRIBED BY CMS-2020-01-R: HCPCS

## 2022-10-16 LAB
ALBUMIN SERPL-MCNC: 3.1 G/DL (ref 3.4–5)
ALP SERPL-CCNC: 108 U/L (ref 45–117)
ALT SERPL-CCNC: 189 U/L (ref 13–61)
AMPHET UR-MCNC: NEGATIVE NG/ML
ANION GAP SERPL CALC-SCNC: 6 MMOL/L (ref 8–16)
APPEARANCE UR: CLEAR
AST SERPL-CCNC: 159 U/L (ref 15–37)
BARBITURATES UR-MCNC: NEGATIVE UG/ML
BENZODIAZ UR SCN-MCNC: NEGATIVE NG/ML
BILIRUB SERPL-MCNC: 0.2 MG/DL (ref 0.2–1)
BILIRUB UR STRIP.AUTO-MCNC: NEGATIVE MG/DL
BUN SERPL-MCNC: 14.8 MG/DL (ref 7–18)
CALCIUM SERPL-MCNC: 8.6 MG/DL (ref 8.5–10.1)
CHLORIDE SERPL-SCNC: 106 MMOL/L (ref 98–107)
CO2 SERPL-SCNC: 31 MMOL/L (ref 21–32)
COCAINE UR-MCNC: POSITIVE NG/ML
COLOR UR: YELLOW
CREAT SERPL-MCNC: 1.1 MG/DL (ref 0.55–1.3)
DEPRECATED RDW RBC AUTO: 14.4 % (ref 11.9–15.9)
GLUCOSE SERPL-MCNC: 90 MG/DL (ref 74–106)
HCT VFR BLD CALC: 37 % (ref 35.4–49)
HGB BLD-MCNC: 12.6 GM/DL (ref 11.7–16.9)
KETONES UR QL STRIP: NEGATIVE
LEUKOCYTE ESTERASE UR QL STRIP.AUTO: NEGATIVE
MCH RBC QN AUTO: 30.4 PG (ref 25.7–33.7)
MCHC RBC AUTO-ENTMCNC: 34 G/DL (ref 32–35.9)
MCV RBC: 89.3 FL (ref 80–96)
METHADONE UR-MCNC: POSITIVE UG/L
NITRITE UR QL STRIP: NEGATIVE
OPIATES UR QL SCN: NEGATIVE
PCP UR QL SCN: NEGATIVE
PH UR: 8 [PH] (ref 5–8)
PLATELET # BLD AUTO: 203 10^3/UL (ref 134–434)
PMV BLD: 9 FL (ref 7.5–11.1)
PROT SERPL-MCNC: 6.3 G/DL (ref 6.4–8.2)
PROT UR QL STRIP: NEGATIVE
PROT UR QL STRIP: NEGATIVE
RBC # BLD AUTO: 4.15 M/MM3 (ref 4–5.6)
SODIUM SERPL-SCNC: 143 MMOL/L (ref 136–145)
SP GR UR: 1.02 (ref 1.01–1.03)
UROBILINOGEN UR STRIP-MCNC: 0.2 MG/DL (ref 0.2–1)
WBC # BLD AUTO: 3.8 K/MM3 (ref 4–10)

## 2022-10-16 PROCEDURE — HZ2ZZZZ DETOXIFICATION SERVICES FOR SUBSTANCE ABUSE TREATMENT: ICD-10-PCS | Performed by: SURGERY

## 2022-10-16 RX ADMIN — Medication SCH MG: at 22:49

## 2022-10-16 RX ADMIN — NICOTINE SCH MG: 21 PATCH TRANSDERMAL at 10:17

## 2022-10-16 RX ADMIN — METHOCARBAMOL PRN MG: 500 TABLET ORAL at 18:42

## 2022-10-16 RX ADMIN — Medication SCH TAB: at 10:17

## 2022-10-17 RX ADMIN — HYDROXYZINE PAMOATE PRN MG: 25 CAPSULE ORAL at 18:29

## 2022-10-17 RX ADMIN — HYDROXYZINE PAMOATE PRN MG: 25 CAPSULE ORAL at 10:10

## 2022-10-17 RX ADMIN — NICOTINE SCH: 21 PATCH TRANSDERMAL at 10:10

## 2022-10-17 RX ADMIN — Medication SCH MG: at 23:06

## 2022-10-17 RX ADMIN — Medication SCH TAB: at 10:10

## 2022-10-17 RX ADMIN — Medication SCH MG: at 23:05

## 2022-10-18 LAB
ALBUMIN SERPL-MCNC: 3.5 G/DL (ref 3.4–5)
ALP SERPL-CCNC: 100 U/L (ref 45–117)
ALT SERPL-CCNC: 536 U/L (ref 13–61)
AST SERPL-CCNC: 530 U/L (ref 15–37)
BILIRUB CONJ SERPL-MCNC: 0.1 MG/DL (ref 0–0.2)
BILIRUB SERPL-MCNC: 0.3 MG/DL (ref 0.2–1)
PROT SERPL-MCNC: 7.2 G/DL (ref 6.4–8.2)

## 2022-10-18 RX ADMIN — Medication SCH MG: at 22:40

## 2022-10-18 RX ADMIN — NICOTINE SCH: 21 PATCH TRANSDERMAL at 10:32

## 2022-10-18 RX ADMIN — HYDROXYZINE PAMOATE PRN MG: 25 CAPSULE ORAL at 10:30

## 2022-10-18 RX ADMIN — Medication SCH TAB: at 10:28

## 2022-10-18 RX ADMIN — METHOCARBAMOL PRN MG: 500 TABLET ORAL at 10:30

## 2022-10-18 RX ADMIN — HYDROXYZINE PAMOATE PRN MG: 25 CAPSULE ORAL at 22:41

## 2022-10-19 RX ADMIN — HYDROXYZINE PAMOATE PRN MG: 25 CAPSULE ORAL at 23:32

## 2022-10-19 RX ADMIN — Medication SCH MG: at 23:32

## 2022-10-19 RX ADMIN — Medication SCH TAB: at 11:17

## 2022-10-19 RX ADMIN — METHOCARBAMOL PRN MG: 500 TABLET ORAL at 23:33

## 2022-10-19 RX ADMIN — NICOTINE SCH MG: 21 PATCH TRANSDERMAL at 11:18

## 2022-10-20 LAB
ALBUMIN SERPL-MCNC: 3.5 G/DL (ref 3.4–5)
ALP SERPL-CCNC: 103 U/L (ref 45–117)
ALT SERPL-CCNC: 424 U/L (ref 13–61)
ANION GAP SERPL CALC-SCNC: 6 MMOL/L (ref 8–16)
AST SERPL-CCNC: 250 U/L (ref 15–37)
BILIRUB SERPL-MCNC: 0.7 MG/DL (ref 0.2–1)
BUN SERPL-MCNC: 19.7 MG/DL (ref 7–18)
CALCIUM SERPL-MCNC: 9.1 MG/DL (ref 8.5–10.1)
CHLORIDE SERPL-SCNC: 97 MMOL/L (ref 98–107)
CO2 SERPL-SCNC: 32 MMOL/L (ref 21–32)
CREAT SERPL-MCNC: 1 MG/DL (ref 0.55–1.3)
GLUCOSE SERPL-MCNC: 146 MG/DL (ref 74–106)
PROT SERPL-MCNC: 7.6 G/DL (ref 6.4–8.2)
SODIUM SERPL-SCNC: 135 MMOL/L (ref 136–145)

## 2022-10-20 RX ADMIN — METHOCARBAMOL PRN MG: 500 TABLET ORAL at 23:01

## 2022-10-20 RX ADMIN — Medication SCH TAB: at 10:45

## 2022-10-20 RX ADMIN — HYDROXYZINE PAMOATE PRN MG: 25 CAPSULE ORAL at 22:59

## 2022-10-20 RX ADMIN — Medication SCH MG: at 22:59

## 2022-10-20 RX ADMIN — METHOCARBAMOL PRN MG: 500 TABLET ORAL at 10:46

## 2022-10-20 RX ADMIN — HYDROXYZINE PAMOATE PRN MG: 25 CAPSULE ORAL at 10:46

## 2022-10-20 RX ADMIN — NICOTINE SCH: 21 PATCH TRANSDERMAL at 10:45

## 2022-10-21 VITALS
TEMPERATURE: 98.1 F | RESPIRATION RATE: 18 BRPM | DIASTOLIC BLOOD PRESSURE: 58 MMHG | HEART RATE: 77 BPM | SYSTOLIC BLOOD PRESSURE: 96 MMHG

## 2022-10-21 RX ADMIN — NICOTINE SCH MG: 21 PATCH TRANSDERMAL at 10:37

## 2022-10-21 RX ADMIN — Medication SCH TAB: at 10:37

## 2022-11-03 ENCOUNTER — HOSPITAL ENCOUNTER (INPATIENT)
Dept: HOSPITAL 74 - YASAS | Age: 42
LOS: 5 days | Discharge: OTHER INSTITUTION | DRG: 773 | End: 2022-11-08
Attending: PSYCHIATRY & NEUROLOGY | Admitting: ALLERGY & IMMUNOLOGY
Payer: COMMERCIAL

## 2022-11-03 VITALS — BODY MASS INDEX: 20.2 KG/M2

## 2022-11-03 DIAGNOSIS — Z91.199: ICD-10-CM

## 2022-11-03 DIAGNOSIS — Z86.19: ICD-10-CM

## 2022-11-03 DIAGNOSIS — F13.232: ICD-10-CM

## 2022-11-03 DIAGNOSIS — R76.11: ICD-10-CM

## 2022-11-03 DIAGNOSIS — F12.20: ICD-10-CM

## 2022-11-03 DIAGNOSIS — F19.24: ICD-10-CM

## 2022-11-03 DIAGNOSIS — F11.20: ICD-10-CM

## 2022-11-03 DIAGNOSIS — Z28.9: ICD-10-CM

## 2022-11-03 DIAGNOSIS — D64.9: ICD-10-CM

## 2022-11-03 DIAGNOSIS — Z28.310: ICD-10-CM

## 2022-11-03 DIAGNOSIS — F31.9: ICD-10-CM

## 2022-11-03 DIAGNOSIS — I95.9: ICD-10-CM

## 2022-11-03 DIAGNOSIS — Z62.810: ICD-10-CM

## 2022-11-03 DIAGNOSIS — F10.20: Primary | ICD-10-CM

## 2022-11-03 DIAGNOSIS — F17.210: ICD-10-CM

## 2022-11-03 DIAGNOSIS — R74.8: ICD-10-CM

## 2022-11-03 DIAGNOSIS — R73.9: ICD-10-CM

## 2022-11-03 DIAGNOSIS — F14.20: ICD-10-CM

## 2022-11-03 LAB
ALBUMIN SERPL-MCNC: 3.1 G/DL (ref 3.4–5)
ALP SERPL-CCNC: 141 U/L (ref 45–117)
ALT SERPL-CCNC: 391 U/L (ref 13–61)
ANION GAP SERPL CALC-SCNC: 7 MMOL/L (ref 8–16)
AST SERPL-CCNC: 229 U/L (ref 15–37)
BILIRUB SERPL-MCNC: 0.4 MG/DL (ref 0.2–1)
BUN SERPL-MCNC: 17 MG/DL (ref 7–18)
CALCIUM SERPL-MCNC: 8.9 MG/DL (ref 8.5–10.1)
CHLORIDE SERPL-SCNC: 103 MMOL/L (ref 98–107)
CO2 SERPL-SCNC: 31 MMOL/L (ref 21–32)
CREAT SERPL-MCNC: 1 MG/DL (ref 0.55–1.3)
DEPRECATED RDW RBC AUTO: 13.9 % (ref 11.9–15.9)
GLUCOSE SERPL-MCNC: 108 MG/DL (ref 74–106)
HCT VFR BLD CALC: 33.8 % (ref 35.4–49)
HGB BLD-MCNC: 11.4 GM/DL (ref 11.7–16.9)
MCH RBC QN AUTO: 29.8 PG (ref 25.7–33.7)
MCHC RBC AUTO-ENTMCNC: 33.7 G/DL (ref 32–35.9)
MCV RBC: 88.6 FL (ref 80–96)
PLATELET # BLD AUTO: 325 10^3/UL (ref 134–434)
PMV BLD: 8.6 FL (ref 7.5–11.1)
PROT SERPL-MCNC: 6.8 G/DL (ref 6.4–8.2)
RBC # BLD AUTO: 3.81 M/MM3 (ref 4–5.6)
SODIUM SERPL-SCNC: 140 MMOL/L (ref 136–145)
WBC # BLD AUTO: 6.2 K/MM3 (ref 4–10)

## 2022-11-03 PROCEDURE — U0003 INFECTIOUS AGENT DETECTION BY NUCLEIC ACID (DNA OR RNA); SEVERE ACUTE RESPIRATORY SYNDROME CORONAVIRUS 2 (SARS-COV-2) (CORONAVIRUS DISEASE [COVID-19]), AMPLIFIED PROBE TECHNIQUE, MAKING USE OF HIGH THROUGHPUT TECHNOLOGIES AS DESCRIBED BY CMS-2020-01-R: HCPCS

## 2022-11-03 PROCEDURE — U0005 INFEC AGEN DETEC AMPLI PROBE: HCPCS

## 2022-11-03 PROCEDURE — HZ2ZZZZ DETOXIFICATION SERVICES FOR SUBSTANCE ABUSE TREATMENT: ICD-10-PCS | Performed by: SURGERY

## 2022-11-03 RX ADMIN — HYDROXYZINE PAMOATE PRN MG: 25 CAPSULE ORAL at 15:40

## 2022-11-03 RX ADMIN — Medication SCH MG: at 23:05

## 2022-11-03 RX ADMIN — METHOCARBAMOL PRN MG: 500 TABLET ORAL at 23:05

## 2022-11-04 RX ADMIN — Medication SCH MG: at 22:19

## 2022-11-04 RX ADMIN — Medication SCH TAB: at 10:42

## 2022-11-04 RX ADMIN — HYDROXYZINE PAMOATE PRN MG: 25 CAPSULE ORAL at 10:43

## 2022-11-04 RX ADMIN — METHOCARBAMOL PRN MG: 500 TABLET ORAL at 17:30

## 2022-11-05 RX ADMIN — Medication SCH MG: at 22:31

## 2022-11-05 RX ADMIN — METHOCARBAMOL PRN MG: 500 TABLET ORAL at 10:47

## 2022-11-05 RX ADMIN — METHOCARBAMOL PRN MG: 500 TABLET ORAL at 18:01

## 2022-11-05 RX ADMIN — Medication SCH TAB: at 10:47

## 2022-11-05 RX ADMIN — HYDROXYZINE PAMOATE PRN MG: 25 CAPSULE ORAL at 10:47

## 2022-11-05 RX ADMIN — HYDROXYZINE PAMOATE PRN MG: 25 CAPSULE ORAL at 22:32

## 2022-11-06 RX ADMIN — Medication SCH MG: at 22:23

## 2022-11-06 RX ADMIN — METHOCARBAMOL PRN MG: 500 TABLET ORAL at 17:55

## 2022-11-06 RX ADMIN — HYDROXYZINE PAMOATE PRN MG: 25 CAPSULE ORAL at 17:55

## 2022-11-06 RX ADMIN — Medication SCH TAB: at 10:25

## 2022-11-06 RX ADMIN — HYDROXYZINE PAMOATE PRN MG: 25 CAPSULE ORAL at 10:27

## 2022-11-06 RX ADMIN — METHOCARBAMOL PRN MG: 500 TABLET ORAL at 05:38

## 2022-11-07 LAB
ALBUMIN SERPL-MCNC: 3.2 G/DL (ref 3.4–5)
ALP SERPL-CCNC: 174 U/L (ref 45–117)
ALT SERPL-CCNC: 594 U/L (ref 13–61)
AST SERPL-CCNC: 449 U/L (ref 15–37)
BILIRUB CONJ SERPL-MCNC: 0.2 MG/DL (ref 0–0.2)
BILIRUB SERPL-MCNC: 0.3 MG/DL (ref 0.2–1)
PROT SERPL-MCNC: 7.2 G/DL (ref 6.4–8.2)

## 2022-11-07 RX ADMIN — Medication SCH MG: at 22:11

## 2022-11-07 RX ADMIN — HYDROXYZINE PAMOATE PRN MG: 25 CAPSULE ORAL at 00:48

## 2022-11-07 RX ADMIN — METHOCARBAMOL PRN MG: 500 TABLET ORAL at 22:12

## 2022-11-07 RX ADMIN — Medication SCH TAB: at 09:59

## 2022-11-07 RX ADMIN — HYDROXYZINE PAMOATE PRN MG: 25 CAPSULE ORAL at 22:12

## 2022-11-07 RX ADMIN — METHOCARBAMOL PRN MG: 500 TABLET ORAL at 00:48

## 2022-11-08 ENCOUNTER — HOSPITAL ENCOUNTER (INPATIENT)
Dept: HOSPITAL 74 - YASAS | Age: 42
LOS: 14 days | Discharge: HOME | DRG: 772 | End: 2022-11-22
Attending: PSYCHIATRY & NEUROLOGY | Admitting: ALLERGY & IMMUNOLOGY
Payer: COMMERCIAL

## 2022-11-08 VITALS
HEART RATE: 79 BPM | SYSTOLIC BLOOD PRESSURE: 97 MMHG | RESPIRATION RATE: 18 BRPM | TEMPERATURE: 97.3 F | DIASTOLIC BLOOD PRESSURE: 63 MMHG

## 2022-11-08 VITALS — RESPIRATION RATE: 18 BRPM

## 2022-11-08 DIAGNOSIS — F19.280: ICD-10-CM

## 2022-11-08 DIAGNOSIS — R79.89: ICD-10-CM

## 2022-11-08 DIAGNOSIS — F19.24: ICD-10-CM

## 2022-11-08 DIAGNOSIS — F19.282: ICD-10-CM

## 2022-11-08 DIAGNOSIS — B19.20: ICD-10-CM

## 2022-11-08 DIAGNOSIS — F11.20: ICD-10-CM

## 2022-11-08 DIAGNOSIS — Z86.69: ICD-10-CM

## 2022-11-08 DIAGNOSIS — F17.210: ICD-10-CM

## 2022-11-08 DIAGNOSIS — B19.10: ICD-10-CM

## 2022-11-08 DIAGNOSIS — F13.20: Primary | ICD-10-CM

## 2022-11-08 PROCEDURE — G0008 ADMIN INFLUENZA VIRUS VAC: HCPCS

## 2022-11-08 PROCEDURE — HZ42ZZZ GROUP COUNSELING FOR SUBSTANCE ABUSE TREATMENT, COGNITIVE-BEHAVIORAL: ICD-10-PCS | Performed by: PSYCHIATRY & NEUROLOGY

## 2022-11-08 RX ADMIN — Medication SCH MG: at 21:17

## 2022-11-08 RX ADMIN — HYDROXYZINE PAMOATE PRN MG: 25 CAPSULE ORAL at 21:19

## 2022-11-08 RX ADMIN — IBUPROFEN PRN MG: 400 TABLET, FILM COATED ORAL at 21:19

## 2022-11-08 RX ADMIN — Medication SCH TAB: at 10:27

## 2022-11-08 RX ADMIN — QUETIAPINE FUMARATE SCH MG: 100 TABLET ORAL at 21:19

## 2022-11-08 RX ADMIN — METHOCARBAMOL PRN MG: 500 TABLET ORAL at 10:28

## 2022-11-08 RX ADMIN — HYDROXYZINE PAMOATE PRN MG: 25 CAPSULE ORAL at 10:28

## 2022-11-09 RX ADMIN — HYDROXYZINE PAMOATE PRN MG: 25 CAPSULE ORAL at 09:55

## 2022-11-09 RX ADMIN — IBUPROFEN PRN MG: 400 TABLET, FILM COATED ORAL at 09:55

## 2022-11-09 RX ADMIN — QUETIAPINE FUMARATE SCH MG: 100 TABLET ORAL at 21:31

## 2022-11-09 RX ADMIN — Medication SCH MG: at 21:31

## 2022-11-09 RX ADMIN — NICOTINE SCH: 21 PATCH TRANSDERMAL at 09:56

## 2022-11-09 RX ADMIN — HYDROXYZINE PAMOATE PRN MG: 25 CAPSULE ORAL at 21:32

## 2022-11-09 RX ADMIN — METHOCARBAMOL PRN MG: 500 TABLET ORAL at 21:32

## 2022-11-09 RX ADMIN — Medication SCH TAB: at 09:54

## 2022-11-10 RX ADMIN — HYDROXYZINE PAMOATE PRN MG: 25 CAPSULE ORAL at 10:31

## 2022-11-10 RX ADMIN — METHOCARBAMOL PRN MG: 500 TABLET ORAL at 21:56

## 2022-11-10 RX ADMIN — Medication SCH MG: at 21:50

## 2022-11-10 RX ADMIN — LACTULOSE SCH GM: 20 SOLUTION ORAL at 14:33

## 2022-11-10 RX ADMIN — QUETIAPINE FUMARATE SCH MG: 100 TABLET ORAL at 21:50

## 2022-11-10 RX ADMIN — Medication SCH TAB: at 10:30

## 2022-11-10 RX ADMIN — HYDROXYZINE PAMOATE PRN MG: 25 CAPSULE ORAL at 21:56

## 2022-11-10 RX ADMIN — NICOTINE SCH: 21 PATCH TRANSDERMAL at 10:30

## 2022-11-10 RX ADMIN — LACTULOSE SCH GM: 20 SOLUTION ORAL at 21:50

## 2022-11-10 RX ADMIN — METHOCARBAMOL PRN MG: 500 TABLET ORAL at 10:31

## 2022-11-11 RX ADMIN — NICOTINE SCH: 21 PATCH TRANSDERMAL at 10:38

## 2022-11-11 RX ADMIN — LACTULOSE SCH: 20 SOLUTION ORAL at 06:27

## 2022-11-11 RX ADMIN — Medication SCH MG: at 21:14

## 2022-11-11 RX ADMIN — Medication SCH TAB: at 10:38

## 2022-11-11 RX ADMIN — LACTULOSE SCH: 20 SOLUTION ORAL at 21:14

## 2022-11-11 RX ADMIN — HYDROXYZINE PAMOATE PRN MG: 25 CAPSULE ORAL at 10:39

## 2022-11-11 RX ADMIN — QUETIAPINE FUMARATE SCH MG: 100 TABLET ORAL at 21:14

## 2022-11-11 RX ADMIN — LACTULOSE SCH: 20 SOLUTION ORAL at 13:06

## 2022-11-11 RX ADMIN — METHOCARBAMOL PRN MG: 500 TABLET ORAL at 21:14

## 2022-11-11 RX ADMIN — METHOCARBAMOL PRN MG: 500 TABLET ORAL at 10:39

## 2022-11-12 RX ADMIN — METHOCARBAMOL PRN MG: 500 TABLET ORAL at 21:47

## 2022-11-12 RX ADMIN — LACTULOSE SCH: 20 SOLUTION ORAL at 06:40

## 2022-11-12 RX ADMIN — Medication SCH MG: at 21:46

## 2022-11-12 RX ADMIN — HYDROXYZINE PAMOATE PRN MG: 25 CAPSULE ORAL at 10:46

## 2022-11-12 RX ADMIN — NICOTINE SCH: 21 PATCH TRANSDERMAL at 10:47

## 2022-11-12 RX ADMIN — METHOCARBAMOL PRN MG: 500 TABLET ORAL at 10:46

## 2022-11-12 RX ADMIN — QUETIAPINE FUMARATE SCH MG: 100 TABLET ORAL at 21:46

## 2022-11-12 RX ADMIN — HYDROXYZINE PAMOATE PRN MG: 25 CAPSULE ORAL at 21:47

## 2022-11-12 RX ADMIN — LACTULOSE SCH: 20 SOLUTION ORAL at 14:31

## 2022-11-12 RX ADMIN — LACTULOSE SCH: 20 SOLUTION ORAL at 21:48

## 2022-11-12 RX ADMIN — Medication SCH TAB: at 10:45

## 2022-11-13 RX ADMIN — Medication SCH MG: at 21:25

## 2022-11-13 RX ADMIN — LACTULOSE SCH: 20 SOLUTION ORAL at 14:28

## 2022-11-13 RX ADMIN — HYDROXYZINE PAMOATE PRN MG: 25 CAPSULE ORAL at 21:26

## 2022-11-13 RX ADMIN — LACTULOSE SCH: 20 SOLUTION ORAL at 06:06

## 2022-11-13 RX ADMIN — METHOCARBAMOL PRN MG: 500 TABLET ORAL at 14:27

## 2022-11-13 RX ADMIN — LACTULOSE SCH: 20 SOLUTION ORAL at 21:26

## 2022-11-13 RX ADMIN — HYDROXYZINE PAMOATE PRN MG: 25 CAPSULE ORAL at 14:27

## 2022-11-13 RX ADMIN — NICOTINE SCH: 21 PATCH TRANSDERMAL at 10:00

## 2022-11-13 RX ADMIN — Medication SCH: at 10:00

## 2022-11-13 RX ADMIN — QUETIAPINE FUMARATE SCH MG: 100 TABLET ORAL at 21:25

## 2022-11-13 RX ADMIN — METHOCARBAMOL PRN MG: 500 TABLET ORAL at 21:26

## 2022-11-14 RX ADMIN — LACTULOSE SCH: 20 SOLUTION ORAL at 22:07

## 2022-11-14 RX ADMIN — METHOCARBAMOL PRN MG: 500 TABLET ORAL at 21:59

## 2022-11-14 RX ADMIN — LACTULOSE SCH: 20 SOLUTION ORAL at 14:27

## 2022-11-14 RX ADMIN — QUETIAPINE FUMARATE SCH MG: 100 TABLET ORAL at 21:58

## 2022-11-14 RX ADMIN — HYDROXYZINE PAMOATE PRN MG: 25 CAPSULE ORAL at 21:58

## 2022-11-14 RX ADMIN — METHOCARBAMOL PRN MG: 500 TABLET ORAL at 10:50

## 2022-11-14 RX ADMIN — Medication SCH TAB: at 10:50

## 2022-11-14 RX ADMIN — LACTULOSE SCH: 20 SOLUTION ORAL at 06:34

## 2022-11-14 RX ADMIN — Medication SCH MG: at 21:58

## 2022-11-14 RX ADMIN — HYDROXYZINE PAMOATE PRN MG: 25 CAPSULE ORAL at 10:50

## 2022-11-14 RX ADMIN — NICOTINE SCH: 21 PATCH TRANSDERMAL at 10:49

## 2022-11-14 RX ADMIN — Medication SCH MG: at 22:07

## 2022-11-15 RX ADMIN — HYDROXYZINE PAMOATE PRN MG: 25 CAPSULE ORAL at 10:55

## 2022-11-15 RX ADMIN — Medication SCH MG: at 21:30

## 2022-11-15 RX ADMIN — METHOCARBAMOL PRN MG: 500 TABLET ORAL at 10:55

## 2022-11-15 RX ADMIN — Medication SCH TAB: at 10:54

## 2022-11-15 RX ADMIN — METHOCARBAMOL PRN MG: 500 TABLET ORAL at 21:30

## 2022-11-15 RX ADMIN — LACTULOSE SCH: 20 SOLUTION ORAL at 13:53

## 2022-11-15 RX ADMIN — NICOTINE SCH: 21 PATCH TRANSDERMAL at 10:54

## 2022-11-15 RX ADMIN — LACTULOSE SCH: 20 SOLUTION ORAL at 06:31

## 2022-11-15 RX ADMIN — LACTULOSE SCH: 20 SOLUTION ORAL at 21:31

## 2022-11-15 RX ADMIN — HYDROXYZINE PAMOATE PRN MG: 25 CAPSULE ORAL at 21:30

## 2022-11-15 RX ADMIN — QUETIAPINE FUMARATE SCH MG: 100 TABLET ORAL at 21:30

## 2022-11-16 RX ADMIN — Medication SCH TAB: at 10:48

## 2022-11-16 RX ADMIN — NICOTINE SCH: 21 PATCH TRANSDERMAL at 10:48

## 2022-11-16 RX ADMIN — LACTULOSE SCH: 20 SOLUTION ORAL at 21:05

## 2022-11-16 RX ADMIN — QUETIAPINE FUMARATE SCH MG: 100 TABLET ORAL at 21:04

## 2022-11-16 RX ADMIN — LACTULOSE SCH: 20 SOLUTION ORAL at 14:26

## 2022-11-16 RX ADMIN — METHOCARBAMOL PRN MG: 500 TABLET ORAL at 10:48

## 2022-11-16 RX ADMIN — LACTULOSE SCH: 20 SOLUTION ORAL at 06:21

## 2022-11-16 RX ADMIN — HYDROXYZINE PAMOATE PRN MG: 25 CAPSULE ORAL at 21:04

## 2022-11-16 RX ADMIN — Medication SCH MG: at 21:04

## 2022-11-16 RX ADMIN — METHOCARBAMOL PRN MG: 500 TABLET ORAL at 21:04

## 2022-11-16 RX ADMIN — HYDROXYZINE PAMOATE PRN MG: 25 CAPSULE ORAL at 10:48

## 2022-11-17 RX ADMIN — LACTULOSE SCH: 20 SOLUTION ORAL at 06:36

## 2022-11-17 RX ADMIN — Medication SCH TAB: at 10:35

## 2022-11-17 RX ADMIN — QUETIAPINE FUMARATE SCH MG: 100 TABLET ORAL at 21:29

## 2022-11-17 RX ADMIN — Medication SCH MG: at 21:29

## 2022-11-17 RX ADMIN — NICOTINE SCH: 21 PATCH TRANSDERMAL at 10:35

## 2022-11-17 RX ADMIN — HYDROXYZINE PAMOATE PRN MG: 25 CAPSULE ORAL at 10:36

## 2022-11-17 RX ADMIN — METHOCARBAMOL PRN MG: 500 TABLET ORAL at 10:36

## 2022-11-17 RX ADMIN — METHOCARBAMOL PRN MG: 500 TABLET ORAL at 21:29

## 2022-11-17 RX ADMIN — HYDROXYZINE PAMOATE PRN MG: 25 CAPSULE ORAL at 21:29

## 2022-11-18 RX ADMIN — HYDROXYZINE PAMOATE PRN MG: 25 CAPSULE ORAL at 10:51

## 2022-11-18 RX ADMIN — METHOCARBAMOL PRN MG: 500 TABLET ORAL at 10:50

## 2022-11-18 RX ADMIN — Medication SCH MG: at 21:16

## 2022-11-18 RX ADMIN — QUETIAPINE FUMARATE SCH MG: 100 TABLET ORAL at 21:16

## 2022-11-18 RX ADMIN — METHOCARBAMOL PRN MG: 500 TABLET ORAL at 21:16

## 2022-11-18 RX ADMIN — HYDROXYZINE PAMOATE PRN MG: 25 CAPSULE ORAL at 21:16

## 2022-11-18 RX ADMIN — Medication SCH TAB: at 10:51

## 2022-11-18 RX ADMIN — NICOTINE SCH: 21 PATCH TRANSDERMAL at 10:51

## 2022-11-19 RX ADMIN — Medication SCH MG: at 21:18

## 2022-11-19 RX ADMIN — HYDROXYZINE PAMOATE PRN MG: 25 CAPSULE ORAL at 10:00

## 2022-11-19 RX ADMIN — QUETIAPINE FUMARATE SCH MG: 100 TABLET ORAL at 21:18

## 2022-11-19 RX ADMIN — METHOCARBAMOL PRN MG: 500 TABLET ORAL at 21:18

## 2022-11-19 RX ADMIN — HYDROXYZINE PAMOATE PRN MG: 25 CAPSULE ORAL at 21:19

## 2022-11-19 RX ADMIN — Medication SCH TAB: at 09:59

## 2022-11-19 RX ADMIN — NICOTINE SCH: 21 PATCH TRANSDERMAL at 10:01

## 2022-11-19 RX ADMIN — METHOCARBAMOL PRN MG: 500 TABLET ORAL at 10:00

## 2022-11-20 RX ADMIN — Medication SCH MG: at 21:09

## 2022-11-20 RX ADMIN — QUETIAPINE FUMARATE SCH MG: 100 TABLET ORAL at 21:08

## 2022-11-20 RX ADMIN — NICOTINE SCH: 21 PATCH TRANSDERMAL at 10:27

## 2022-11-20 RX ADMIN — HYDROXYZINE PAMOATE PRN MG: 25 CAPSULE ORAL at 21:08

## 2022-11-20 RX ADMIN — METHOCARBAMOL PRN MG: 500 TABLET ORAL at 10:27

## 2022-11-20 RX ADMIN — METHOCARBAMOL PRN MG: 500 TABLET ORAL at 21:08

## 2022-11-20 RX ADMIN — HYDROXYZINE PAMOATE PRN MG: 25 CAPSULE ORAL at 10:27

## 2022-11-20 RX ADMIN — Medication SCH TAB: at 10:27

## 2022-11-21 RX ADMIN — Medication SCH MG: at 21:32

## 2022-11-21 RX ADMIN — HYDROXYZINE PAMOATE PRN MG: 25 CAPSULE ORAL at 21:32

## 2022-11-21 RX ADMIN — METHOCARBAMOL PRN MG: 500 TABLET ORAL at 21:32

## 2022-11-21 RX ADMIN — METHOCARBAMOL PRN MG: 500 TABLET ORAL at 09:38

## 2022-11-21 RX ADMIN — Medication SCH TAB: at 09:38

## 2022-11-21 RX ADMIN — QUETIAPINE FUMARATE SCH MG: 100 TABLET ORAL at 21:32

## 2022-11-21 RX ADMIN — HYDROXYZINE PAMOATE PRN MG: 25 CAPSULE ORAL at 09:38

## 2022-11-21 RX ADMIN — NICOTINE SCH: 21 PATCH TRANSDERMAL at 09:39

## 2022-11-22 VITALS — HEART RATE: 78 BPM | TEMPERATURE: 98.2 F | DIASTOLIC BLOOD PRESSURE: 81 MMHG | SYSTOLIC BLOOD PRESSURE: 114 MMHG
